# Patient Record
(demographics unavailable — no encounter records)

---

## 2020-04-22 LAB
ANTIBODY SCREEN, EXTERNAL: NEGATIVE
CHLAMYDIA, EXTERNAL: NEGATIVE
HCT, EXTERNAL: NORMAL
HGB, EXTERNAL: NORMAL
N. GONORRHEA, EXTERNAL: NEGATIVE
NIPT, EXTERNAL: NORMAL
PLATELET CNT,   EXTERNAL: NORMAL
RPR, EXTERNAL: NORMAL
RUBELLA, EXTERNAL: NORMAL
TYPE, ABO & RH, EXTERNAL: NORMAL

## 2020-08-03 PROBLEM — Z3A.26 26 WEEKS GESTATION OF PREGNANCY: Status: ACTIVE | Noted: 2020-08-03

## 2020-08-03 NOTE — PROGRESS NOTES
04/22/2020 11 wks 3 days [id number] Present No Other (see comments) 108/64 sitting 145.6 lbs With clothes none none neg Negative none Comments: US DONE 4-. OB PACKET GIVEN/DWP. PN LABS DONE TODAY. PLANS TO BOTTLE FEED. MATERNI T 21 INFO GIVEN/DWP. SLP US results DWP, PN packet info DWP, Questions addressed  05/26/2020 16 wks 2 days [id number] Present No Other (see comments) 110/62 sitting 147.2 lbs With clothes trace none neg Negative none Comments: HA`S, NAUSEA, EPIGASTRIC PAIN, CONSTIPATION. WILL START A STOOL SOFTENER. SLP US at . Nayely Garcia 144 for growth, MaterniT: NEGATIVE, LVM , DWP OTC meds for gastritis  06/23/2020 20 wks 2 days [id number] US Present Yes 108/62 sitting 146.8 lbs With clothes trace Comments: US DONE TODAY. UNABLE TO VOID. HA`S, NAUSEA. GS/HGB NV. INST GIVEN. SLP Questions addressed, US: adeq.  growth, normal appearing anatomy